# Patient Record
Sex: FEMALE | Race: BLACK OR AFRICAN AMERICAN | NOT HISPANIC OR LATINO | Employment: FULL TIME | URBAN - METROPOLITAN AREA
[De-identification: names, ages, dates, MRNs, and addresses within clinical notes are randomized per-mention and may not be internally consistent; named-entity substitution may affect disease eponyms.]

---

## 2023-05-22 ENCOUNTER — LAB (OUTPATIENT)
Dept: LAB | Facility: LAB | Age: 53
End: 2023-05-22
Payer: COMMERCIAL

## 2023-05-22 ENCOUNTER — OFFICE VISIT (OUTPATIENT)
Dept: PRIMARY CARE | Facility: CLINIC | Age: 53
End: 2023-05-22
Payer: COMMERCIAL

## 2023-05-22 VITALS
HEART RATE: 94 BPM | HEIGHT: 63 IN | WEIGHT: 226.6 LBS | TEMPERATURE: 97.1 F | DIASTOLIC BLOOD PRESSURE: 76 MMHG | BODY MASS INDEX: 40.15 KG/M2 | SYSTOLIC BLOOD PRESSURE: 135 MMHG | OXYGEN SATURATION: 98 %

## 2023-05-22 DIAGNOSIS — I10 ESSENTIAL (PRIMARY) HYPERTENSION: ICD-10-CM

## 2023-05-22 DIAGNOSIS — R60.0 BILATERAL LOWER EXTREMITY EDEMA: ICD-10-CM

## 2023-05-22 DIAGNOSIS — F32.A DEPRESSION, UNSPECIFIED DEPRESSION TYPE: ICD-10-CM

## 2023-05-22 DIAGNOSIS — Z00.00 ANNUAL PHYSICAL EXAM: Primary | ICD-10-CM

## 2023-05-22 DIAGNOSIS — Z00.00 ANNUAL PHYSICAL EXAM: ICD-10-CM

## 2023-05-22 DIAGNOSIS — F41.9 ANXIETY: ICD-10-CM

## 2023-05-22 LAB
ALBUMIN (G/DL) IN SER/PLAS: 4.7 G/DL (ref 3.4–5)
ANION GAP IN SER/PLAS: 15 MMOL/L (ref 10–20)
CALCIUM (MG/DL) IN SER/PLAS: 10.5 MG/DL (ref 8.6–10.6)
CARBON DIOXIDE, TOTAL (MMOL/L) IN SER/PLAS: 31 MMOL/L (ref 21–32)
CHLORIDE (MMOL/L) IN SER/PLAS: 102 MMOL/L (ref 98–107)
CHOLESTEROL (MG/DL) IN SER/PLAS: 200 MG/DL (ref 0–199)
CHOLESTEROL IN HDL (MG/DL) IN SER/PLAS: 53.6 MG/DL
CHOLESTEROL/HDL RATIO: 3.7
CREATININE (MG/DL) IN SER/PLAS: 0.85 MG/DL (ref 0.5–1.05)
ESTIMATED AVERAGE GLUCOSE FOR HBA1C: 134 MG/DL
GFR FEMALE: 83 ML/MIN/1.73M2
GLUCOSE (MG/DL) IN SER/PLAS: 107 MG/DL (ref 74–99)
HEMOGLOBIN A1C/HEMOGLOBIN TOTAL IN BLOOD: 6.3 %
LDL: 113 MG/DL (ref 0–99)
PHOSPHATE (MG/DL) IN SER/PLAS: 2.9 MG/DL (ref 2.5–4.9)
POTASSIUM (MMOL/L) IN SER/PLAS: 3.6 MMOL/L (ref 3.5–5.3)
SODIUM (MMOL/L) IN SER/PLAS: 144 MMOL/L (ref 136–145)
TRIGLYCERIDE (MG/DL) IN SER/PLAS: 168 MG/DL (ref 0–149)
UREA NITROGEN (MG/DL) IN SER/PLAS: 11 MG/DL (ref 6–23)
VLDL: 34 MG/DL (ref 0–40)

## 2023-05-22 PROCEDURE — 36415 COLL VENOUS BLD VENIPUNCTURE: CPT

## 2023-05-22 PROCEDURE — 3078F DIAST BP <80 MM HG: CPT | Performed by: STUDENT IN AN ORGANIZED HEALTH CARE EDUCATION/TRAINING PROGRAM

## 2023-05-22 PROCEDURE — 3075F SYST BP GE 130 - 139MM HG: CPT | Performed by: STUDENT IN AN ORGANIZED HEALTH CARE EDUCATION/TRAINING PROGRAM

## 2023-05-22 PROCEDURE — 80069 RENAL FUNCTION PANEL: CPT

## 2023-05-22 PROCEDURE — 1036F TOBACCO NON-USER: CPT | Performed by: STUDENT IN AN ORGANIZED HEALTH CARE EDUCATION/TRAINING PROGRAM

## 2023-05-22 PROCEDURE — 83036 HEMOGLOBIN GLYCOSYLATED A1C: CPT

## 2023-05-22 PROCEDURE — 80061 LIPID PANEL: CPT

## 2023-05-22 PROCEDURE — 99396 PREV VISIT EST AGE 40-64: CPT | Performed by: STUDENT IN AN ORGANIZED HEALTH CARE EDUCATION/TRAINING PROGRAM

## 2023-05-22 RX ORDER — CHLORTHALIDONE 25 MG/1
25 TABLET ORAL DAILY
COMMUNITY
End: 2023-05-22 | Stop reason: SDUPTHER

## 2023-05-22 RX ORDER — CHLORTHALIDONE 25 MG/1
25 TABLET ORAL DAILY
Qty: 90 TABLET | Refills: 3 | Status: SHIPPED | OUTPATIENT
Start: 2023-05-22

## 2023-05-22 RX ORDER — PAROXETINE 30 MG/1
30 TABLET, FILM COATED ORAL DAILY
Qty: 90 TABLET | Refills: 3 | Status: SHIPPED | OUTPATIENT
Start: 2023-05-22

## 2023-05-22 RX ORDER — PAROXETINE 30 MG/1
30 TABLET, FILM COATED ORAL DAILY
COMMUNITY
End: 2023-05-22 | Stop reason: SDUPTHER

## 2023-05-22 NOTE — PROGRESS NOTES
"Subjective   Patient ID: Ludmila is a 53 y.o. female with PMH of hypertension, anxiety, and depression who presents for Annual Exam (CPE).    Concerns today: Has been noting some leg swelling for about 1 week. Both legs but R>L. Just swollen, no pain. No recent changes in activity. Has gained a lot of weight recently. Has noted peeing a little more than normal. No changes in drinking. Drinks a lot of water. No chest pain or difficulty breathing. No numbness or tingling in the feet.    # Health Maintenance  - Last prior HM: 2022  - Patient's rating of their own health: Fair  - Dental Care: last prior dental visit: 2 months ago // brushes teeth: yes // flosses teeth: yes // denies current tooth pain  - Vision: last prior ophtho visit: last year // corrective devices: glasses // recent vision: no concerns, occasional blurring with tiredness  - Hearing: denies recent hearing loss: no  - Diet: \"terrible.\" Just eats and snacks, replaced smoking with snacks. Eats cereal, popcorn, cookies, chips, donuts. Eats one regular meal a day.  - Exercise: not exercising currently. Goes walking with son sometimes. Feels like body stiffening up.  - Weight: increasing, about 40 lbs in the last year  - Smoking: ex-smoker - quit last year (1 ppd for 1 year, 0.5 ppd for about 24 years)  - EtOH: Alcohol Use: No, patient does not drink alcohol.  - Illicit substances: denied.  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  - Colon CA: last prior screening: never // family h/o colon ca? No    WOMEN  - Menstrual Status: S/P complete hysterectomy  - Pregnancy history:  x2  - Sexual History: Not currently sexually active  - Bladder dysfunction? has not had any episodes of incontinence  - Cervical CA: s/p total hysterectomy  - Breast CA: last prior mammo: never had one    Review of Systems: as above in HPI    Current Outpatient Medications " "  Medication Instructions    chlorthalidone (HYGROTON) 25 mg, oral, Daily    PARoxetine (PAXIL) 30 mg, oral, Daily     Objective     Vitals: /76 (BP Location: Left arm, Patient Position: Sitting)   Pulse 94   Temp 36.2 °C (97.1 °F)   Ht 1.588 m (5' 2.5\")   Wt 103 kg (226 lb 9.6 oz)   SpO2 98%   BMI 40.79 kg/m²      Physical Exam  Vitals reviewed.   Constitutional:       General: She is not in acute distress.     Appearance: Normal appearance. She is obese.   HENT:      Head: Normocephalic and atraumatic.   Eyes:      Conjunctiva/sclera: Conjunctivae normal.   Cardiovascular:      Rate and Rhythm: Normal rate and regular rhythm.      Heart sounds: No murmur heard.     No friction rub. No gallop.   Pulmonary:      Effort: Pulmonary effort is normal. No respiratory distress.      Breath sounds: No wheezing, rhonchi or rales.   Musculoskeletal:      Right lower leg: No tenderness. 1+ Pitting Edema (R>L) present.      Left lower leg: No tenderness. 1+ Pitting Edema present.      Right ankle: Normal pulse.      Left ankle: Normal pulse.   Neurological:      Mental Status: She is alert.       Assessment/Plan     # Routine Health Maintenance  IMMUNIZATIONS  Immunization History   Administered Date(s) Administered    Tdap 03/30/2018, 10/26/2019   - Prevnar (PCV20): not currently indicated d/t lack of risk factors  - Tdap: next due October 2029  - Shingrix (50+): recommended, patient currently declines  - Lifetime HIV, HepC: results not on file but patient reports completion w/ negative results  - Lipid Panel (35M,45F): ordered  - DM screening: ordered  - HTN screening: mildly elevated above goal (135/76 w/ goal of <130/80)  - Tobacco Cessation: quit smoking 1 year ago (~13 year pack history)  - Dental: recommended follow up every 6mo  - Eye exam: recommended follow up annually  - Colonoscopy (45-75): ordered  - Lung CA screening (50-80, >20py): not indicated, pack years <20  - Pap smear (21-65): s/p total " hysterectomy, not indicated  - Breast CA screening: ordered    Problem List Items Addressed This Visit          Circulatory    Essential (primary) hypertension     -BP mildly above goal at 135 systolic w/ goal of <130  -extensive lifestyle modification discussion performed; patient appears to have good motivation to make changes  -will continue to monitor         Relevant Medications    chlorthalidone (Hygroton) 25 mg tablet    Other Relevant Orders    Renal Function Panel (Completed)       Musculoskeletal    Bilateral lower extremity edema     -no concern DVT given lack of pain, lack of recent immobility, and bilateral nature of swelling  -suspect venous hypertension given no hx of renal, cardiac, or hepatic disease  -will check RFP for further evaluation  -recommended compression, elevation of the legs  -lifestyle modifications         Relevant Orders    Renal Function Panel (Completed)       Other    Depression    Relevant Medications    PARoxetine (Paxil) 30 mg tablet    Anxiety    Relevant Medications    PARoxetine (Paxil) 30 mg tablet    Annual physical exam - Primary    Relevant Orders    Colonoscopy    BI mammo bilateral screening tomosynthesis    Lipid Panel (Completed)    Hemoglobin A1C (Completed)       Attending Supervision: Patient discussed with attending physician Dr. Jane.    RTC in 3 months to assess progress with lifestyle modifications, or earlier as needed.    Andrés Aguilar MD   PGY-2  [facility]

## 2023-05-22 NOTE — PATIENT INSTRUCTIONS
Dear Ms. Davidson,     It was a pleasure getting to manage your care with you today.     Labs:  We ordered Labs for you at the  Labs.     Directions to  Labs:   1. You can find them as you exit our clinic walk past the coffee shop.   2. Turn Right and walk to the end of the [address] (you will see a staircase).   3. When you arrive at the end of the [address], turn left and walk down the hallway with skylights.   4. Half way down the [address] light hallway you will see the lab on your left    Prescriptions:  We have sent medication prescriptions to your pharmacy on file. Please pick them up at your earliest convenience.     Referral:   We have provided you the below referrals. Please call to schedule referrals if no one has contacted you within 3 days.   1. Colonoscopy  2. Mammogram    Try to get over the counter compression socks that go up to your knee. The strength should be 20-30 mmHg.  Elevate your legs as much as possible when you're not walking around.  Try to elevate the foot of your bed with bricks, or elevate your legs with a pillow.    Follow up Appointment: 3 months    Emergency  In the case of an emergency please call 911 or visit the Emergency Department immediately for evaluation.     We look forward to continuing your care here at our Clinic. Take Care.     Sincerely,   Andrés Aguilar MD

## 2023-05-22 NOTE — PROGRESS NOTES
53 years old female here today for  visit  Colon cancer, mammogram screening ordered   No pap smear (had a hysterectomy previously)   Immunizations, shingrix declined today   Labs ordered today HBA1C, lipid panel, RFP (given worsening lower extremity edema)   Medication refills provided today as well.    Allen Parham MD  Family Medicine, PGY-3  [facility]

## 2023-05-24 PROBLEM — J45.909 REACTIVE AIRWAY DISEASE (HHS-HCC): Status: ACTIVE | Noted: 2023-05-24

## 2023-05-24 PROBLEM — E55.9 VITAMIN D DEFICIENCY: Status: ACTIVE | Noted: 2023-05-24

## 2023-05-24 PROBLEM — F17.200 NICOTINE DEPENDENCE: Status: ACTIVE | Noted: 2023-05-24

## 2023-05-24 PROBLEM — K21.9 LARYNGOPHARYNGEAL REFLUX (LPR): Status: ACTIVE | Noted: 2023-05-24

## 2023-05-24 PROBLEM — F17.200 NICOTINE DEPENDENCE: Status: RESOLVED | Noted: 2023-05-24 | Resolved: 2023-05-24

## 2023-05-24 PROBLEM — R73.03 PREDIABETES: Status: ACTIVE | Noted: 2023-05-24

## 2023-05-24 PROBLEM — R00.2 PALPITATIONS: Status: ACTIVE | Noted: 2023-05-24

## 2023-05-24 PROBLEM — J30.2 SEASONAL ALLERGIES: Status: ACTIVE | Noted: 2023-05-24

## 2023-05-24 PROBLEM — E87.6 HYPOKALEMIA: Status: RESOLVED | Noted: 2023-05-24 | Resolved: 2023-05-24

## 2023-05-24 PROBLEM — E87.6 HYPOKALEMIA: Status: ACTIVE | Noted: 2023-05-24

## 2023-05-24 NOTE — ASSESSMENT & PLAN NOTE
-no concern DVT given lack of pain, lack of recent immobility, and bilateral nature of swelling  -suspect venous hypertension given no hx of renal, cardiac, or hepatic disease  -will check RFP for further evaluation  -recommended compression, elevation of the legs  -lifestyle modifications

## 2023-05-24 NOTE — ASSESSMENT & PLAN NOTE
-BP mildly above goal at 135 systolic w/ goal of <130  -extensive lifestyle modification discussion performed; patient appears to have good motivation to make changes  -will continue to monitor

## 2023-05-25 NOTE — PROGRESS NOTES
I reviewed with the resident the medical history and the resident’s findings on physical examination.  I discussed with the resident the patient’s diagnosis and concur with the treatment plan as documented in the resident note.     Farzad Jane MD

## 2024-01-30 ENCOUNTER — OFFICE VISIT (OUTPATIENT)
Dept: PRIMARY CARE | Facility: CLINIC | Age: 54
End: 2024-01-30
Payer: COMMERCIAL

## 2024-01-30 VITALS
BODY MASS INDEX: 41.31 KG/M2 | TEMPERATURE: 97.4 F | HEART RATE: 71 BPM | HEIGHT: 62 IN | RESPIRATION RATE: 18 BRPM | SYSTOLIC BLOOD PRESSURE: 132 MMHG | DIASTOLIC BLOOD PRESSURE: 84 MMHG | OXYGEN SATURATION: 99 % | WEIGHT: 224.5 LBS

## 2024-01-30 DIAGNOSIS — I10 PRIMARY HYPERTENSION: Primary | ICD-10-CM

## 2024-01-30 DIAGNOSIS — R20.2 PARESTHESIA OF BOTH HANDS: ICD-10-CM

## 2024-01-30 DIAGNOSIS — F41.9 ANXIETY: ICD-10-CM

## 2024-01-30 PROCEDURE — 1036F TOBACCO NON-USER: CPT

## 2024-01-30 PROCEDURE — 3075F SYST BP GE 130 - 139MM HG: CPT

## 2024-01-30 PROCEDURE — 3079F DIAST BP 80-89 MM HG: CPT

## 2024-01-30 PROCEDURE — 99213 OFFICE O/P EST LOW 20 MIN: CPT

## 2024-01-30 ASSESSMENT — ENCOUNTER SYMPTOMS
NAUSEA: 0
NUMBNESS: 1
BACK PAIN: 0
CHEST TIGHTNESS: 0
EYE PAIN: 0
WHEEZING: 1
VOMITING: 0
COUGH: 0
FEVER: 0
UNEXPECTED WEIGHT CHANGE: 0
NECK PAIN: 0
SHORTNESS OF BREATH: 0
PALPITATIONS: 0
ARTHRALGIAS: 1
ABDOMINAL PAIN: 0
LIGHT-HEADEDNESS: 0
DYSPHORIC MOOD: 0
WEAKNESS: 0
APPETITE CHANGE: 0
SLEEP DISTURBANCE: 0
CONSTIPATION: 0
COLOR CHANGE: 0
CHILLS: 0
FATIGUE: 0
DIARRHEA: 0
DECREASED CONCENTRATION: 0
TROUBLE SWALLOWING: 0
HEADACHES: 0

## 2024-01-30 ASSESSMENT — PAIN SCALES - GENERAL: PAINLEVEL: 0-NO PAIN

## 2024-01-30 NOTE — PATIENT INSTRUCTIONS
It was so great to see you today Ludmila Davidson  . Today we discussed:    -record blood pressures at home  -schedule colonoscopy at convenience. Call number below   -Ordered massage script. Contact [facility] to schedule  -Follow up 6 months    Call [phone]  For Care if you need to schedule appointment with specialist or images.    Follow up in       You were see by:    Dr. Renea Moore D.O.  [facility]   Phone: [phone]

## 2024-01-30 NOTE — PROGRESS NOTES
"Subjective   Patient ID: Ludmila Davidson is a 54 y.o. female who presents for HTN follow up and additional concerns.      HPI   Concerns  BL finger numbness, ring & middle fingers. Started two months ago, worse in morning. Thinks it's due to sleep. Denies weakness, tingling    Hypertension  BP Readings from Last 3 Encounters:   01/30/24 132/84   05/22/23 135/76   04/20/22 (!) 142/91     - meds:   - missing doses?:  140 histolics  - taking BP at home?: ***  - smoking: ***  - diet: ***  - Consistent NSAIDs?: ***  - denies HA, chest pains, SOB, LE edema, vision changes        Review of Systems    Objective   /84 (BP Location: Left arm, Patient Position: Sitting, BP Cuff Size: Adult)   Pulse 71   Temp 36.3 °C (97.4 °F) (Temporal)   Resp 18   Ht 1.575 m (5' 2\")   Wt 102 kg (224 lb 8 oz)   SpO2 99%   BMI 41.06 kg/m²     Physical Exam    Assessment/Plan   Diagnoses and all orders for this visit:  Primary hypertension  -    Anxiety    Paresthesia of both hands  -     Referral to Massage Therapy; Future  Other orders  -     Follow Up In Primary Care     Etiologies: cervical radiculopathy, compression, inflammation, metabolic  "

## 2024-01-30 NOTE — PROGRESS NOTES
Subjective   Patient ID: Ludmila Davidson is a 54 y.o. female who presents for HTN follow up and additional concerns.    HPI   Concerns    #BL finger numbness, ring & middle fingers.  - Started two months ago, worse in morning.   - Thinks it's due to sleep positioning and bilateral shoulder pain.   - R > L  - Denies weakness, tingling, pain, or loss of sensation.   - hx of carpal tunnel during pregnancy and pt denies that this is similar    # Nodule in her left middle finger between the PIP and DIP joints  - started about 6 months ago  - slight increase in size since first appearance  - denies pain     Hypertension  BP Readings from Last 3 Encounters:   01/30/24 132/84   05/22/23 135/76   04/20/22 (!) 142/91     - meds: adherent, takes chlorthalidone 25 mg   - missing doses?:  denies missing doses, 140 histolics  - taking BP at home?: sometimes, ranges 130-140/60-80  - smoking: quit 2 years ago  - diet: trying to incorporate more fruits and veggies  - Consistent NSAIDs?: prior consistent NSAID use due to HA; denies recent HA and NSAID use within the past 2 months  - denies HA, chest pains, SOB, LE edema, vision changes    Review of Systems   Constitutional:  Negative for appetite change, chills, fatigue, fever and unexpected weight change.   HENT:  Negative for ear pain, hearing loss and trouble swallowing.    Eyes:  Negative for pain and visual disturbance.   Respiratory:  Positive for wheezing. Negative for cough, chest tightness and shortness of breath.    Cardiovascular:  Negative for chest pain, palpitations and leg swelling.   Gastrointestinal:  Negative for abdominal pain, constipation, diarrhea, nausea and vomiting.   Musculoskeletal:  Positive for arthralgias. Negative for back pain and neck pain.   Skin:  Negative for color change and rash.   Neurological:  Positive for numbness. Negative for weakness, light-headedness and headaches.   Psychiatric/Behavioral:  Negative for decreased concentration, dysphoric  "mood and sleep disturbance.        Objective   /84 (BP Location: Left arm, Patient Position: Sitting, BP Cuff Size: Adult)   Pulse 71   Temp 36.3 °C (97.4 °F) (Temporal)   Resp 18   Ht 1.575 m (5' 2\")   Wt 102 kg (224 lb 8 oz)   SpO2 99%   BMI 41.06 kg/m²     Physical Exam  Constitutional:       Appearance: Normal appearance. She is obese.   HENT:      Head: Normocephalic and atraumatic.   Eyes:      Conjunctiva/sclera: Conjunctivae normal.      Pupils: Pupils are equal, round, and reactive to light.   Cardiovascular:      Rate and Rhythm: Normal rate and regular rhythm.      Pulses: Normal pulses.      Heart sounds: Normal heart sounds.   Pulmonary:      Effort: Pulmonary effort is normal.      Breath sounds: Normal breath sounds.   Musculoskeletal:         General: Normal range of motion.      Cervical back: Normal range of motion.      Right lower le+ Edema present.      Left lower le+ Edema present.   Skin:     General: Skin is warm and dry.      Capillary Refill: Capillary refill takes less than 2 seconds.      Comments:   Nodule on the L middle finger between PIP and DIP; nontender to palpation, soft, nonflunctuant   Neurological:      General: No focal deficit present.      Mental Status: She is alert and oriented to person, place, and time.      Sensory: Sensation is intact.      Motor: No weakness.      Comments:   No loss of sensation in bilateral UE and fingers  Phalen test negative; Spurling test negative  5/5 strength in all extremeties; no weakness noted on exam   Psychiatric:         Mood and Affect: Mood normal. Mood is not anxious or depressed.         Speech: Speech normal.         Behavior: Behavior normal.         Thought Content: Thought content normal.         Judgment: Judgment normal.       Assessment/Plan     Ludmila Davidson is a 54 year old woman with past medical history of HTN, prediabetes (last A1c 6.3), depression, and anxiety presenting for blood pressure check. " Patient has been without antihypertensives for over a month.  She is unsure what she was on previously with prior PCP. She also has acute concerns for BL finger numbness, ring & middle fingers. Negative Phalen's & Spurling's special test. Likely muscular tightness shoulder/neck with pressure on bundle nerves.  Numbness in middle and ring fingers (R>L), ddx considered includes carpal tunnel, cervical radiculopathy, inflammation, and metabolic.  Additionally, nodule in her left middle finger between the PIP and DIP joints likely benign epidermoid cyst. Pt is HDS and in no acute distress. Detailed plan below:    Diagnoses and all orders for this visit:  Primary hypertension, at goal of <140/90  -     IO /84  -     Continue chlorthalidone    -     Referral to [facility]; Future: Pt encouraged to improve on healthy diet and exercise habits and referred to [facility].   -    Pt encouraged to monitor BP at home and given BP log to bring into next appt  Anxiety  -   Continue paroxetine   Paresthesia of both hands, (3rd & 4th digits tips)  -     Referral to Massage Therapy; Future  - Manage with conservative measures: Epson salt baths, heating pads, stretching neck, shoulders, & back   Other orders  -     Follow Up In Primary Care 6 months        Elzbieta Bonds, MS3        Renea Moore,  FM PGY2  I saw and evaluated the patient with the medical student. I personally obtained the key and critical portions of the history and physical exam. I reviewed and modified the student's documentation and discussed patient with the medical student. I agree with the above documentation and medical decision making.    Discussed with Dr. Oor

## 2024-01-31 NOTE — PROGRESS NOTES
I reviewed the resident documentation and discussed the patient with the resident. I agree with the resident's medical decision making as documented in the note.    Jose A Oro MD

## 2024-08-26 ENCOUNTER — TELEMEDICINE (OUTPATIENT)
Dept: PRIMARY CARE | Facility: CLINIC | Age: 54
End: 2024-08-26
Payer: COMMERCIAL

## 2024-08-26 DIAGNOSIS — I10 ESSENTIAL (PRIMARY) HYPERTENSION: ICD-10-CM

## 2024-08-26 DIAGNOSIS — F32.A DEPRESSION, UNSPECIFIED DEPRESSION TYPE: ICD-10-CM

## 2024-08-26 DIAGNOSIS — F41.9 ANXIETY: ICD-10-CM

## 2024-08-26 PROCEDURE — 1036F TOBACCO NON-USER: CPT | Performed by: NURSE PRACTITIONER

## 2024-08-26 PROCEDURE — 99213 OFFICE O/P EST LOW 20 MIN: CPT | Performed by: NURSE PRACTITIONER

## 2024-08-26 RX ORDER — PAROXETINE 30 MG/1
30 TABLET, FILM COATED ORAL DAILY
Qty: 90 TABLET | Refills: 0 | Status: SHIPPED | OUTPATIENT
Start: 2024-08-26

## 2024-08-26 RX ORDER — CHLORTHALIDONE 25 MG/1
25 TABLET ORAL DAILY
Qty: 90 TABLET | Refills: 0 | Status: SHIPPED | OUTPATIENT
Start: 2024-08-26

## 2024-08-26 NOTE — ASSESSMENT & PLAN NOTE
Has been stable with the paroxetine  She has been out of paroxetine for 2 days  She denies SI  Refilled paroxetine and discussed to send request via [employer] to office to get follow-up appointment scheduled

## 2024-08-26 NOTE — PROGRESS NOTES
"Subjective   Patient ID: Ludmila Davidson is a 54 y.o. female who presents for No chief complaint on file..    This visit was completed via [employer] due to the restrictions of the COVID-19 pandemic. All issues as below were discussed and addressed but no physical exam was performed. If it was felt that the patient should be evaluated in clinic than they were directed there. The patient verbally consented to the visit.  Patient is located in [place] and verified   Patient presents virtually for a medication refill she did send a refill request to her PCP office last week and had not heard back.  She called to schedule an appointment last week as well and was told that they would get back to her.  She is due for her yearly physical and for a follow-up.  She has been out of her chlorthalidone and paroxetine for 2 days.  She denies suicidal ideations or chest pain.  She states that she is starting to feel a little \"off\" from not having the paroxetine.  She does take her blood pressure at home normally on a normal basis and it has been stable         Review of Systems   All other systems reviewed and are negative.      Objective   There were no vitals taken for this visit.    Physical Exam    Assessment/Plan   Problem List Items Addressed This Visit             ICD-10-CM    Essential (primary) hypertension I10     Discussed with patient that she is due for a follow-up and I can refill 1 refill for her to get into her PCP office  I also discussed with her to send a message via [employer] for an appointment request so that she can get in sooner.  Red flags discussed such as chest pain shortness of breath or headache and she will continue to monitor her blood pressure         Relevant Medications    chlorthalidone (Hygroton) 25 mg tablet    Depression F32.A     Has been stable with the paroxetine  She has been out of paroxetine for 2 days  She denies SI  Refilled paroxetine and discussed to send request via [employer] to office to get " follow-up appointment scheduled         Relevant Medications    PARoxetine (Paxil) 30 mg tablet    Anxiety F41.9    Relevant Medications    PARoxetine (Paxil) 30 mg tablet

## 2024-08-26 NOTE — ASSESSMENT & PLAN NOTE
Discussed with patient that she is due for a follow-up and I can refill 1 refill for her to get into her PCP office  I also discussed with her to send a message via [employer] for an appointment request so that she can get in sooner.  Red flags discussed such as chest pain shortness of breath or headache and she will continue to monitor her blood pressure

## 2024-11-26 ENCOUNTER — LAB (OUTPATIENT)
Dept: LAB | Facility: LAB | Age: 54
End: 2024-11-26
Payer: COMMERCIAL

## 2024-11-26 ENCOUNTER — APPOINTMENT (OUTPATIENT)
Dept: PRIMARY CARE | Facility: CLINIC | Age: 54
End: 2024-11-26
Payer: COMMERCIAL

## 2024-11-26 ENCOUNTER — TELEPHONE (OUTPATIENT)
Dept: PRIMARY CARE | Facility: CLINIC | Age: 54
End: 2024-11-26

## 2024-11-26 VITALS
WEIGHT: 227 LBS | HEART RATE: 85 BPM | SYSTOLIC BLOOD PRESSURE: 129 MMHG | HEIGHT: 63 IN | TEMPERATURE: 97 F | DIASTOLIC BLOOD PRESSURE: 84 MMHG | BODY MASS INDEX: 40.22 KG/M2 | OXYGEN SATURATION: 94 %

## 2024-11-26 DIAGNOSIS — R79.89 ELEVATED LFTS: ICD-10-CM

## 2024-11-26 DIAGNOSIS — E11.9 TYPE 2 DIABETES MELLITUS WITHOUT COMPLICATION, WITHOUT LONG-TERM CURRENT USE OF INSULIN (MULTI): Primary | ICD-10-CM

## 2024-11-26 DIAGNOSIS — Z12.2 SCREENING FOR LUNG CANCER: ICD-10-CM

## 2024-11-26 DIAGNOSIS — Z00.00 HEALTHCARE MAINTENANCE: ICD-10-CM

## 2024-11-26 DIAGNOSIS — E66.813 CLASS 3 SEVERE OBESITY DUE TO EXCESS CALORIES WITHOUT SERIOUS COMORBIDITY WITH BODY MASS INDEX (BMI) OF 40.0 TO 44.9 IN ADULT: ICD-10-CM

## 2024-11-26 DIAGNOSIS — E66.01 CLASS 3 SEVERE OBESITY DUE TO EXCESS CALORIES WITHOUT SERIOUS COMORBIDITY WITH BODY MASS INDEX (BMI) OF 40.0 TO 44.9 IN ADULT: ICD-10-CM

## 2024-11-26 DIAGNOSIS — Z59.41 FOOD INSECURITY: ICD-10-CM

## 2024-11-26 DIAGNOSIS — Z12.11 COLON CANCER SCREENING: ICD-10-CM

## 2024-11-26 DIAGNOSIS — Z12.31 ENCOUNTER FOR SCREENING MAMMOGRAM FOR MALIGNANT NEOPLASM OF BREAST: ICD-10-CM

## 2024-11-26 DIAGNOSIS — Z00.00 HEALTHCARE MAINTENANCE: Primary | ICD-10-CM

## 2024-11-26 LAB
ALBUMIN SERPL BCP-MCNC: 4.6 G/DL (ref 3.4–5)
ALP SERPL-CCNC: 84 U/L (ref 33–110)
ALT SERPL W P-5'-P-CCNC: 47 U/L (ref 7–45)
ANION GAP SERPL CALC-SCNC: 14 MMOL/L (ref 10–20)
AST SERPL W P-5'-P-CCNC: 34 U/L (ref 9–39)
BILIRUB SERPL-MCNC: 0.7 MG/DL (ref 0–1.2)
BUN SERPL-MCNC: 13 MG/DL (ref 6–23)
CALCIUM SERPL-MCNC: 9.7 MG/DL (ref 8.6–10.6)
CHLORIDE SERPL-SCNC: 98 MMOL/L (ref 98–107)
CHOLEST SERPL-MCNC: 189 MG/DL (ref 0–199)
CHOLESTEROL/HDL RATIO: 3.9
CO2 SERPL-SCNC: 32 MMOL/L (ref 21–32)
CREAT SERPL-MCNC: 0.63 MG/DL (ref 0.5–1.05)
EGFRCR SERPLBLD CKD-EPI 2021: >90 ML/MIN/1.73M*2
EST. AVERAGE GLUCOSE BLD GHB EST-MCNC: 151 MG/DL
GLUCOSE SERPL-MCNC: 149 MG/DL (ref 74–99)
HBA1C MFR BLD: 6.9 %
HCV AB SER QL: NONREACTIVE
HDLC SERPL-MCNC: 48.1 MG/DL
LDLC SERPL CALC-MCNC: 125 MG/DL
NON HDL CHOLESTEROL: 141 MG/DL (ref 0–149)
POTASSIUM SERPL-SCNC: 3.3 MMOL/L (ref 3.5–5.3)
PROT SERPL-MCNC: 7.2 G/DL (ref 6.4–8.2)
SODIUM SERPL-SCNC: 141 MMOL/L (ref 136–145)
TRIGL SERPL-MCNC: 78 MG/DL (ref 0–149)
VLDL: 16 MG/DL (ref 0–40)

## 2024-11-26 PROCEDURE — 83036 HEMOGLOBIN GLYCOSYLATED A1C: CPT

## 2024-11-26 PROCEDURE — 3074F SYST BP LT 130 MM HG: CPT

## 2024-11-26 PROCEDURE — 3008F BODY MASS INDEX DOCD: CPT

## 2024-11-26 PROCEDURE — 80061 LIPID PANEL: CPT

## 2024-11-26 PROCEDURE — 3079F DIAST BP 80-89 MM HG: CPT

## 2024-11-26 PROCEDURE — 86803 HEPATITIS C AB TEST: CPT

## 2024-11-26 PROCEDURE — 1036F TOBACCO NON-USER: CPT

## 2024-11-26 PROCEDURE — 80053 COMPREHEN METABOLIC PANEL: CPT

## 2024-11-26 PROCEDURE — 99396 PREV VISIT EST AGE 40-64: CPT

## 2024-11-26 PROCEDURE — 36415 COLL VENOUS BLD VENIPUNCTURE: CPT

## 2024-11-26 RX ORDER — VIT C/E/ZN/COPPR/LUTEIN/ZEAXAN 250MG-90MG
CAPSULE ORAL
COMMUNITY
Start: 2019-06-05

## 2024-11-26 RX ORDER — ATORVASTATIN CALCIUM 40 MG/1
40 TABLET, FILM COATED ORAL DAILY
Qty: 100 TABLET | Refills: 3 | Status: SHIPPED | OUTPATIENT
Start: 2024-11-26 | End: 2025-12-31

## 2024-11-26 RX ORDER — METFORMIN HYDROCHLORIDE 500 MG/1
500 TABLET ORAL
Qty: 200 TABLET | Refills: 3 | Status: SHIPPED | OUTPATIENT
Start: 2024-11-26 | End: 2025-12-31

## 2024-11-26 SDOH — ECONOMIC STABILITY - FOOD INSECURITY: FOOD INSECURITY: Z59.41

## 2024-11-26 ASSESSMENT — ENCOUNTER SYMPTOMS
LOSS OF SENSATION IN FEET: 0
DEPRESSION: 1
OCCASIONAL FEELINGS OF UNSTEADINESS: 0

## 2024-11-26 ASSESSMENT — PATIENT HEALTH QUESTIONNAIRE - PHQ9
2. FEELING DOWN, DEPRESSED OR HOPELESS: NOT AT ALL
1. LITTLE INTEREST OR PLEASURE IN DOING THINGS: NOT AT ALL
SUM OF ALL RESPONSES TO PHQ9 QUESTIONS 1 AND 2: 0

## 2024-11-26 ASSESSMENT — COLUMBIA-SUICIDE SEVERITY RATING SCALE - C-SSRS
2. HAVE YOU ACTUALLY HAD ANY THOUGHTS OF KILLING YOURSELF?: NO
1. IN THE PAST MONTH, HAVE YOU WISHED YOU WERE DEAD OR WISHED YOU COULD GO TO SLEEP AND NOT WAKE UP?: NO
6. HAVE YOU EVER DONE ANYTHING, STARTED TO DO ANYTHING, OR PREPARED TO DO ANYTHING TO END YOUR LIFE?: NO

## 2024-11-26 ASSESSMENT — PAIN SCALES - GENERAL: PAINLEVEL_OUTOF10: 0-NO PAIN

## 2024-11-26 NOTE — PROGRESS NOTES
Subjective   Patient ID: Ludmila Davidson is a 54 y.o. female with PMH of HTN, prediabetes, depression, and anxiety  who presents for Annual Exam.    # Health Maintenance  - Last prior HM: Last year  - Patient's rating of their own health: Good  - Dental Care: last prior dental visit last year // brushes teeth every day // flosses teeth yes // denies current tooth pain  - Vision: corrective devices: glasses // recent vision: last year  - Hearing: denies recent hearing loss   - Diet: Appetite has been good. Diet is poor and report she has gained a lot of weight. She eats fast food, fried foods, and drinks sodas. Reports she wants to work on it and lose weight  - Exercise: not exercising but planning to start   - Weight: stable,   - Smoking: ex-smoker Stopped smoking 2 years ago, smoked around 1 pack per day since she was 24   - EtOH: Alcohol Use: No, patient does not drink alcohol.  - Illicit substances: denied.  - Employment: Works with ex-offenders to reintegrate them into society  - Living Situation: Lives with 2 daughters and feels safe at home  - Colon CA: Never been screened. family h/o colon ca? No    WOMEN  - Menstrual Status:  Had a hysterectomy in 2014  - No LMP recorded.  - Pregnancy history: No obstetric history on file.  - Cervical CA: last prior pap 4/2014 // h/o abnormal pap? No  - Breast CA: Never had a mammogram  - Not currently sexually active, last active 2 years ago  - Does not want to be screened for STI    HPI  #Obesity  - Reported that she quit smoking 3 years ago and has been eating unhealthy since and has gained a lot of weight  - Reports she has not been able to lose any weight but also reports her diet is poor and does not exercise  - Interested in losing weight and plans to start exercising    Current Outpatient Medications   Medication Instructions    chlorthalidone (HYGROTON) 25 mg, oral, Daily    cholecalciferol (Vitamin D-3) 25 MCG (1000 UT) capsule Take by mouth.    PARoxetine (PAXIL) 30  "mg, oral, Daily       Objective     Vitals: /84 (BP Location: Left arm, Patient Position: Sitting)   Pulse 85   Temp 36.1 °C (97 °F) (Temporal)   Ht 1.588 m (5' 2.5\")   Wt 103 kg (227 lb)   SpO2 94%   BMI 40.86 kg/m²    Physical Exam  Constitutional:       General: She is not in acute distress.     Appearance: Normal appearance. She is obese. She is not ill-appearing.   HENT:      Head: Normocephalic and atraumatic.   Eyes:      Extraocular Movements: Extraocular movements intact.   Cardiovascular:      Rate and Rhythm: Normal rate and regular rhythm.      Heart sounds: Normal heart sounds. No murmur heard.     No friction rub. No gallop.   Pulmonary:      Effort: Pulmonary effort is normal. No respiratory distress.      Breath sounds: Normal breath sounds. No stridor. No wheezing, rhonchi or rales.   Abdominal:      General: There is no distension.      Palpations: Abdomen is soft. There is no mass.      Tenderness: There is no abdominal tenderness. There is no guarding.      Hernia: No hernia is present.   Musculoskeletal:         General: Normal range of motion.      Cervical back: Normal range of motion.      Right lower leg: No edema.      Left lower leg: No edema.   Skin:     General: Skin is warm and dry.   Neurological:      Mental Status: She is alert.   Psychiatric:         Mood and Affect: Mood normal.         Behavior: Behavior normal.         PHQ2: Score of 0, negative  Over the past 2 weeks, how often have you been bothered by any of the following problems?  Little interest or pleasure in doing things: Not at all  Feeling down, depressed, or hopeless: Not at all  Patient Health Questionnaire-2 Score: 0    Food insecurity: Yes, would like a referral to [facility]  Food Insecurity: Not on file       Assessment/Plan   Ludmila Davidson is a 54 y.o. female with PMH of HTN, prediabetes, depression, and anxiety  who presents for Annual Exam.    # Routine Health Maintenance  Immunization History "   Administered Date(s) Administered    Tdap vaccine, age 7 year and older (BOOSTRIX, ADACEL) 03/30/2018, 10/26/2019     - Flu vaccine: recommended annually, Declined flu shot  - COVID vaccine: recommended completion of primary series and recommended boosters,   - Prevnar (PCV20): Declined but advised that she may return to clinic if she changes her mind  - Tdap: next due 2029  - Shingrix (50+): Declined but advised that she can get it at a pharmacy if she changes her mind  - Declined STI screen: GC, chlamydia, trich, syphilis, HIV  - Lifetime HepC: Ordered  - Lipid Panel (35M,45F): Ordered  - DM screening: Last Hgb A1c 6.3% from 5/2023, ordered updated Hgb A1c  - Ordered CMP  - HTN screening: wnl today  - Food Insecurity screen: Referral for [facility] placed  - Depression: PHQ-2 Score of 0, negative  - Tobacco Cessation: Ex-smoker  - Last Dental: recommended follow up every 6mo   - Last Eye exam: recommended follow up annually   - Colonoscopy (45-75): Referral placed  - Lung CA screening (50-80, >20py): discussed importance of continued surveillance rather than single exam, discussed r/b/a, with shared decision making patient and referral placed  - Pap smear (21-65F): Hysterectomy in 2014  - Breast CA screening (40-74F): Referral placed    #Obesity  - Discussed weight loss program [facility] and patient was interested in enrolling  - Referral placed for [facility]    Problem List Items Addressed This Visit    None  Visit Diagnoses       Healthcare maintenance    -  Primary    Relevant Orders    Lipid panel    Hemoglobin A1c    Comprehensive metabolic panel    Hepatitis C antibody    Class 3 severe obesity due to excess calories without serious comorbidity with body mass index (BMI) of 40.0 to 44.9 in adult        Relevant Orders    Referral to [facility]    Food insecurity        Relevant Orders    Referral to [facility]    Screening for lung cancer        Relevant Orders    CT lung screening low dose    Colon  cancer screening        Relevant Orders    Colonoscopy Screening; Average Risk Patient    Encounter for screening mammogram for malignant neoplasm of breast        Relevant Orders    BI mammo bilateral screening tomosynthesis            Attending Supervision: discussed with attending physician (cosigner listed on this note - Dr. Jane).    RTC in 6 months for follow up, or earlier as needed.    Reviewed and approved by JUAN HAGER on 11/26/24 at 11:19 AM.

## 2024-11-26 NOTE — TELEPHONE ENCOUNTER
Result Communication    Resulted Orders   Lipid panel   Result Value Ref Range    Cholesterol 189 0 - 199 mg/dL      Comment:            Age      Desirable   Borderline High   High     0-19 Y     0 - 169       170 - 199     >/= 200    20-24 Y     0 - 189       190 - 224     >/= 225         >24 Y     0 - 199       200 - 239     >/= 240   **All ranges are based on fasting samples. Specific   therapeutic targets will vary based on patient-specific   cardiac risk.    Pediatric guidelines reference:Pediatrics 2011, 128(S5).Adult guidelines reference: NCEP ATPIII Guidelines,JACK 2001, 258:2486-97    Venipuncture immediately after or during the administration of Metamizole may lead to falsely low results. Testing should be performed immediately prior to Metamizole dosing.    HDL-Cholesterol 48.1 mg/dL      Comment:        Age       Very Low   Low     Normal    High    0-19 Y    < 35      < 40     40-45     ----  20-24 Y    ----     < 40      >45      ----        >24 Y      ----     < 40     40-60      >60      Cholesterol/HDL Ratio 3.9       Comment:        Ref Values  Desirable  < 3.4  High Risk  > 5.0    LDL Calculated 125 (H) <=99 mg/dL      Comment:                                  Near   Borderline      AGE      Desirable  Optimal    High     High     Very High     0-19 Y     0 - 109     ---    110-129   >/= 130     ----    20-24 Y     0 - 119     ---    120-159   >/= 160     ----      >24 Y     0 -  99   100-129  130-159   160-189     >/=190      VLDL 16 0 - 40 mg/dL    Triglycerides 78 0 - 149 mg/dL      Comment:      Age              Desirable        Borderline         High        Very High  SEX:B           mg/dL             mg/dL               mg/dL      mg/dL  <=14D                       ----               ----        ----  15D-365D                    ----               ----        ----  1Y-9Y           0-74               75-99             >=100       ----  10Y-19Y        0-89                             >=130       ----  20Y-24Y        0-114             115-149             >=150      ----  >= 25Y         0-149             150-199             200-499    >=500      Venipuncture immediately after or during the administration of Metamizole may lead to falsely low results. Testing should be performed immediately prior to Metamizole dosing.    Non HDL Cholesterol 141 0 - 149 mg/dL      Comment:            Age       Desirable   Borderline High   High     Very High     0-19 Y     0 - 119       120 - 144     >/= 145    >/= 160    20-24 Y     0 - 149       150 - 189     >/= 190      ----         >24 Y    30 mg/dL above LDL Cholesterol goal     Hemoglobin A1c   Result Value Ref Range    Hemoglobin A1C 6.9 (H) See comment %    Estimated Average Glucose 151 Not Established mg/dL    Narrative    Diagnosis of Diabetes-Adults  Non-Diabetic: < or = 5.6%  Increased risk for developing diabetes: 5.7-6.4%  Diagnostic of diabetes: > or = 6.5%       Comprehensive metabolic panel   Result Value Ref Range    Glucose 149 (H) 74 - 99 mg/dL    Sodium 141 136 - 145 mmol/L    Potassium 3.3 (L) 3.5 - 5.3 mmol/L    Chloride 98 98 - 107 mmol/L    Bicarbonate 32 21 - 32 mmol/L    Anion Gap 14 10 - 20 mmol/L    Urea Nitrogen 13 6 - 23 mg/dL    Creatinine 0.63 0.50 - 1.05 mg/dL    eGFR >90 >60 mL/min/1.73m*2      Comment:      Calculations of estimated GFR are performed using the 2021 CKD-EPI Study Refit equation without the race variable for the IDMS-Traceable creatinine methods.  https://jasn.asnjournals.org/content/early/2021/09/22/ASN.6356943244    Calcium 9.7 8.6 - 10.6 mg/dL    Albumin 4.6 3.4 - 5.0 g/dL    Alkaline Phosphatase 84 33 - 110 U/L    Total Protein 7.2 6.4 - 8.2 g/dL    AST 34 9 - 39 U/L    Bilirubin, Total 0.7 0.0 - 1.2 mg/dL    ALT 47 (H) 7 - 45 U/L      Comment:      Patients treated with Sulfasalazine may generate falsely decreased results for ALT.   Hepatitis C antibody   Result Value Ref Range    Hepatitis C AB  Nonreactive Nonreactive      Comment:      Results from patients taking biotin supplements or receiving high-dose biotin therapy should be interpreted with caution due to possible interference with this test. Providers may contact their local laboratory for further information.       3:12 PM      Results were successfully communicated with the patient and they acknowledged their understanding. Patient is now diabetic and LDL is elevated. Discussed starting Metformin and atorvastatin which the patient is agreeable with. Potassium is mildly low and patient was encouraged to eat potassium rich foods. ALT was mildly elevated in absence of alcohol. Will plan to recheck in 2-3 months. Hepatitis C is nonreactive

## 2024-12-10 DIAGNOSIS — F41.9 ANXIETY: ICD-10-CM

## 2024-12-10 DIAGNOSIS — F32.A DEPRESSION, UNSPECIFIED DEPRESSION TYPE: ICD-10-CM

## 2024-12-10 DIAGNOSIS — I10 ESSENTIAL (PRIMARY) HYPERTENSION: ICD-10-CM

## 2024-12-10 RX ORDER — CHLORTHALIDONE 25 MG/1
25 TABLET ORAL DAILY
Qty: 90 TABLET | Refills: 3 | Status: SHIPPED | OUTPATIENT
Start: 2024-12-10

## 2024-12-10 RX ORDER — PAROXETINE 30 MG/1
30 TABLET, FILM COATED ORAL DAILY
Qty: 90 TABLET | Refills: 1 | Status: SHIPPED | OUTPATIENT
Start: 2024-12-10

## 2024-12-10 NOTE — PROGRESS NOTES
Patient did not get chlorthalidone 25 mg daily and paroxetine 30 mg daily renewed at her recent appointment..  I have renewed both to her new [facility] [address]  Hydrochlorothiazide is preferred by the insurer but given that the patient is sensitive to medications we decided not to make that change.  Beers list recommendation against paroxetine does not yet apply to patient but might evaluate whether this medication could be replaced by an SSRI that is less prone to weight gain and sedation.   She plans to follow-up with Dr. Ford in 6 months and could reassess at that time.

## 2025-01-20 ENCOUNTER — CLINICAL SUPPORT (OUTPATIENT)
Dept: NUTRITION | Facility: HOSPITAL | Age: 55
End: 2025-01-20
Payer: COMMERCIAL

## 2025-01-20 NOTE — PROGRESS NOTES
[facility]  Diet Recommendation 1: Diabetes  Diet Recommendation 2: Healthy Eating  Food Intolerance Avoidance: NKFA  Nutrition Goals Stated: Lose wt, improve diabetes without use of medicine  Household Size: 2 Family Members  Interventions: Referral Number: 1st 6 Mo Referral 6 Mos  Interventions: Visit Number: 1 of 6 Visits - Max 6 Visits/Referral Each 6 Mo Period  Education Today: Carbohydrate Counting  Follow Up Notes for Future Visits: Was told she has diabetes in November and was ordered Metformin. She has not taken it, says she does not want to get hooked on it. Has not met with an RD or received any other information regarding dx. Recommended reaching out to MD via [employer] in order to discuss dx, goals, and plan of care. Provided basic overview of nutrition for diabetes- small frequent meals, balanced meals/snacks, importance of fiber, etc. Pt states she has already begun to increase her fiber intake by doing wheat cereal and leafy greens. Got tuna and jamil lettuce to make tuna boats. Also going to try zucchini and bran flakes from [employer].  Grains: 50-75% Whole  Fruit: 0-25% Fresh  Vegetables: 50-75% Fresh  Proteins: 0 Plant-based Items  Dairy: 25-50% Lowfat  Relevant [facility] Inpatient Discharge Items: Seen today at [facility]  Originating Site of Referral Order: [facility]  Initials of RD Assisting Today: [facility]

## 2025-06-12 DIAGNOSIS — F41.9 ANXIETY: ICD-10-CM

## 2025-06-12 DIAGNOSIS — F32.A DEPRESSION, UNSPECIFIED DEPRESSION TYPE: ICD-10-CM

## 2025-06-13 RX ORDER — PAROXETINE 30 MG/1
30 TABLET, FILM COATED ORAL DAILY
Qty: 90 TABLET | Refills: 1 | Status: SHIPPED | OUTPATIENT
Start: 2025-06-13

## 2025-07-07 ENCOUNTER — OFFICE VISIT (OUTPATIENT)
Dept: PRIMARY CARE | Facility: CLINIC | Age: 55
End: 2025-07-07
Payer: COMMERCIAL

## 2025-07-07 VITALS
DIASTOLIC BLOOD PRESSURE: 75 MMHG | HEIGHT: 63 IN | OXYGEN SATURATION: 99 % | SYSTOLIC BLOOD PRESSURE: 143 MMHG | WEIGHT: 228 LBS | TEMPERATURE: 95 F | HEART RATE: 73 BPM | RESPIRATION RATE: 16 BRPM | BODY MASS INDEX: 40.4 KG/M2

## 2025-07-07 DIAGNOSIS — H53.9 VISION CHANGES: ICD-10-CM

## 2025-07-07 DIAGNOSIS — F41.9 ANXIETY: ICD-10-CM

## 2025-07-07 DIAGNOSIS — E11.9 TYPE 2 DIABETES MELLITUS WITHOUT COMPLICATION, WITHOUT LONG-TERM CURRENT USE OF INSULIN: Primary | ICD-10-CM

## 2025-07-07 DIAGNOSIS — F32.A DEPRESSION, UNSPECIFIED DEPRESSION TYPE: ICD-10-CM

## 2025-07-07 DIAGNOSIS — I10 ESSENTIAL (PRIMARY) HYPERTENSION: ICD-10-CM

## 2025-07-07 PROCEDURE — 36415 COLL VENOUS BLD VENIPUNCTURE: CPT | Performed by: FAMILY MEDICINE

## 2025-07-07 PROCEDURE — 3078F DIAST BP <80 MM HG: CPT | Performed by: FAMILY MEDICINE

## 2025-07-07 PROCEDURE — 3077F SYST BP >= 140 MM HG: CPT | Performed by: FAMILY MEDICINE

## 2025-07-07 PROCEDURE — 3008F BODY MASS INDEX DOCD: CPT | Performed by: FAMILY MEDICINE

## 2025-07-07 PROCEDURE — 99214 OFFICE O/P EST MOD 30 MIN: CPT | Performed by: FAMILY MEDICINE

## 2025-07-07 ASSESSMENT — ENCOUNTER SYMPTOMS
OCCASIONAL FEELINGS OF UNSTEADINESS: 0
LOSS OF SENSATION IN FEET: 0
DEPRESSION: 0

## 2025-07-07 ASSESSMENT — PAIN SCALES - GENERAL: PAINLEVEL_OUTOF10: 0-NO PAIN

## 2025-07-07 NOTE — PROGRESS NOTES
Subjective   Patient ID: Ludmila Davidson is a 55 y.o. female who presents for Follow-up.    HPI   Ludmila Davidson is a 55 year old female with a past medical history of DMII, obesity, HTN, HLD, sleep disturbances, and anxiety presenting for a follow-up visit.     DM  - Diagnosed in the fall  - Rx for metformin- has not received it or started taking   - She was also referred to [facility]'s wellness department for guidance on lifestyle modification and dietary counseling, but did not attend, and is not interested in attending. She does not exercise, this is limited due to back pain  - Due to DM and HTN, has elevated ASCVD risk. She was prescribed atorvastatin but has not started this yet. She would like to start this medication and understands its importance  - Pt notes that she has been struggling to lose weight. She understands the negative health impact obesity, and believes her back pain is due to it. She is not interested in seeing a nutritionist at this time. Would like to explore weight loss medication options      Eye pain  -She currently endorses left eye pain, with a point of tenderness over her left eyebrow. She describes her pain as throbbing, comes and goes, and within the distribution of the left side of the face, left ear, left neck, and left shoulder. Notes that she has been dealing with these symptoms for two years now.   - She also endorses blurry vision bilaterally. Wears reading glasses, and was recently seen by an optometrist. She has not had a recent diabetic eye exam. Denies double vision, floaters, or complete blindness. Also denies hearing loss, polyuria, dysuria, leg swelling, or facial swelling.     HTN  - She notes compliance with chlorthalidone. She does not measure her blood pressure at home.     Other issues deferred to future visits:  - She continues to note sleep disturbances, and frequently wakes up at around 3:00 AM. Denies difficulty falling asleep, and believes she gets around 6-7 hours of  "sleep per night. She believes her anxiety is well controlled with paroxetine.   - She continues to have hot flashes and irritability due to menopause, but notes that these symptoms are tolerable.   - Notes numbness and tingling in her hands with shooting pain in her left forearm. Believes these symptoms are due to carpal tunnel, which she had while she was pregnant.     Health maintenance  - She is willing to schedule a colonoscopy, but is not willing to complete a mammogram, flu vaccine, or low-dose CT. She has not followed with a gynecologist s/p hysterectomy. Received a pneumonia vaccine in April 2025.     ROS:   Denies CP, SOB, N/V/D/C, fever, chills, recent fluctuations in weight, cough, rhinorrhea, post-nasal drip, abdominal pain, and changes in urination.     Objective   /75   Pulse 73   Temp 35 °C (95 °F)   Resp 16   Ht 1.588 m (5' 2.5\")   Wt 103 kg (228 lb)   SpO2 99%   BMI 41.04 kg/m²     Physical Exam  Constitutional:       General: She is not in acute distress.     Appearance: She is obese. She is not diaphoretic.   HENT:      Head: Normocephalic and atraumatic.      Right Ear: Hearing normal.      Left Ear: Hearing normal.      Ears:      Comments: No rash or erythema surrounding external ear.   Eyes:      General: Lids are normal. No visual field deficit or scleral icterus.        Right eye: No foreign body or discharge.         Left eye: No foreign body or discharge.      Extraocular Movements: Extraocular movements intact.      Conjunctiva/sclera: Conjunctivae normal.      Pupils: Pupils are equal, round, and reactive to light.      Visual Fields: Right eye visual fields normal and left eye visual fields normal.      Comments: Has glasses   Cardiovascular:      Rate and Rhythm: Normal rate and regular rhythm.   Pulmonary:      Effort: Pulmonary effort is normal. No respiratory distress.      Breath sounds: Normal breath sounds. No wheezing.   Abdominal:      General: Abdomen is flat. " Bowel sounds are normal. There is no distension.      Palpations: Abdomen is soft.      Tenderness: There is no abdominal tenderness. There is no guarding.   Musculoskeletal:      Cervical back: Normal range of motion and neck supple. No rigidity or tenderness.   Lymphadenopathy:      Cervical: No cervical adenopathy.   Neurological:      Mental Status: She is alert.         Assessment/Plan     Assessment  Ludmila Davidson is a 55 year old female with a past medical history of DMII, obesity, HTN, HLD, sleep disturbances, and anxiety presenting for a follow-up visit.     Plan:    7/7/25 Updates  -Ordered A1c, BMP, lipid panel, ESR, and albumin-creatinine ratio.   -Ordered metformin  -Ordered atorvastatin  -Colonoscopy scheduled  -Will update patient with medication regimen based on pending labs   -Will follow up in 1 month    DMII:  -11/26/24 A1c: 6.9%  -11/26/2024 renal function normal.   -Repeat A1c Ordered  -BMP ordered  -Albumin-creatinine ratio ordered  -Will discuss metformin vs glp1 agonist pending lab results     Obesity:  -BMI at visit: 41.04  -Will update patient with medication regimen based on pending labs     Blurry vision:  -11/26/24 A1c: 6.9%  -Repeat A1c Ordered  -Referral to ophthalmologist placed for evaluation.     Eye Pain:  Headache:  -Suspect most likely 2/2 vision changes vs tension headache. However given distribution cannot rule out temporal arteritis. Will obtain ESR    HTN:  -BP today: 148/82   -4/14/25 BP: 138/73  -11/26/24 K: 3.3  -Currently on chlorthalidone 25 mg QD  -Will adjust medication regimen based on today's BMP.     HLD:  -Ordered atorvastatin  -Lipid panel ordered    Preventative:  -Encouraged colonoscopy  - Declines mammogram, pap or LDCT at this time, will continue to address    Levar Smiley - MS-3    Patient was seen and examined by myself in conjunction with medical student. I have edited note above. Miko Moreno

## 2025-07-08 RX ORDER — ATORVASTATIN CALCIUM 40 MG/1
40 TABLET, FILM COATED ORAL DAILY
Qty: 90 TABLET | Refills: 1 | Status: SHIPPED | OUTPATIENT
Start: 2025-07-08 | End: 2026-08-12

## 2025-07-08 RX ORDER — PAROXETINE 30 MG/1
30 TABLET, FILM COATED ORAL DAILY
Qty: 90 TABLET | Refills: 1 | Status: SHIPPED | OUTPATIENT
Start: 2025-07-08

## 2025-07-08 RX ORDER — CHLORTHALIDONE 25 MG/1
25 TABLET ORAL DAILY
Qty: 90 TABLET | Refills: 1 | Status: SHIPPED | OUTPATIENT
Start: 2025-07-08

## 2025-07-10 LAB
ALBUMIN/CREAT UR: 4 MG/G CREAT
ANION GAP SERPL CALCULATED.4IONS-SCNC: 15 MMOL/L (CALC) (ref 7–17)
BUN SERPL-MCNC: 15 MG/DL (ref 7–25)
BUN/CREAT SERPL: ABNORMAL (CALC) (ref 6–22)
CALCIUM SERPL-MCNC: 10.3 MG/DL (ref 8.6–10.4)
CHLORIDE SERPL-SCNC: 97 MMOL/L (ref 98–110)
CHOLEST SERPL-MCNC: NORMAL MG/DL
CHOLEST/HDLC SERPL: NORMAL (CALC)
CO2 SERPL-SCNC: 25 MMOL/L (ref 20–32)
CREAT SERPL-MCNC: 0.82 MG/DL (ref 0.5–1.03)
CREAT UR-MCNC: 157 MG/DL (ref 20–275)
EGFRCR SERPLBLD CKD-EPI 2021: 85 ML/MIN/1.73M2
ERYTHROCYTE [SEDIMENTATION RATE] IN BLOOD BY WESTERGREN METHOD: 11 MM/H
EST. AVERAGE GLUCOSE BLD GHB EST-MCNC: 154 MG/DL
EST. AVERAGE GLUCOSE BLD GHB EST-SCNC: 8.5 MMOL/L
GLUCOSE SERPL-MCNC: 112 MG/DL (ref 65–99)
HBA1C MFR BLD: 7 %
HDLC SERPL-MCNC: NORMAL MG/DL
LDLC SERPL CALC-MCNC: NORMAL MG/DL (CALC)
MICROALBUMIN UR-MCNC: 0.7 MG/DL
NONHDLC SERPL-MCNC: NORMAL MG/DL (CALC)
POTASSIUM SERPL-SCNC: ABNORMAL MMOL/L
SODIUM SERPL-SCNC: 137 MMOL/L (ref 135–146)
TRIGL SERPL-MCNC: NORMAL MG/DL

## 2025-07-11 DIAGNOSIS — E11.9 TYPE 2 DIABETES MELLITUS WITHOUT COMPLICATION, WITHOUT LONG-TERM CURRENT USE OF INSULIN: Primary | ICD-10-CM

## 2025-08-27 ENCOUNTER — OFFICE VISIT (OUTPATIENT)
Dept: PRIMARY CARE | Facility: CLINIC | Age: 55
End: 2025-08-27
Payer: COMMERCIAL

## 2025-08-27 VITALS
DIASTOLIC BLOOD PRESSURE: 72 MMHG | TEMPERATURE: 97.7 F | HEIGHT: 63 IN | OXYGEN SATURATION: 97 % | BODY MASS INDEX: 38.8 KG/M2 | RESPIRATION RATE: 18 BRPM | WEIGHT: 219 LBS | SYSTOLIC BLOOD PRESSURE: 127 MMHG | HEART RATE: 62 BPM

## 2025-08-27 DIAGNOSIS — E11.9 TYPE 2 DIABETES MELLITUS WITHOUT COMPLICATION, WITHOUT LONG-TERM CURRENT USE OF INSULIN: Primary | ICD-10-CM

## 2025-08-27 DIAGNOSIS — I10 ESSENTIAL (PRIMARY) HYPERTENSION: ICD-10-CM

## 2025-08-27 PROCEDURE — 3008F BODY MASS INDEX DOCD: CPT | Performed by: FAMILY MEDICINE

## 2025-08-27 PROCEDURE — 3074F SYST BP LT 130 MM HG: CPT | Performed by: FAMILY MEDICINE

## 2025-08-27 PROCEDURE — 99212 OFFICE O/P EST SF 10 MIN: CPT | Performed by: FAMILY MEDICINE

## 2025-08-27 PROCEDURE — 3078F DIAST BP <80 MM HG: CPT | Performed by: FAMILY MEDICINE

## 2025-08-27 PROCEDURE — 99214 OFFICE O/P EST MOD 30 MIN: CPT | Performed by: FAMILY MEDICINE

## 2025-08-27 RX ORDER — CHLORTHALIDONE 25 MG/1
25 TABLET ORAL DAILY
Qty: 90 TABLET | Refills: 1 | Status: SHIPPED | OUTPATIENT
Start: 2025-08-27

## 2025-08-27 ASSESSMENT — PATIENT HEALTH QUESTIONNAIRE - PHQ9
2. FEELING DOWN, DEPRESSED OR HOPELESS: NOT AT ALL
SUM OF ALL RESPONSES TO PHQ9 QUESTIONS 1 AND 2: 0
1. LITTLE INTEREST OR PLEASURE IN DOING THINGS: NOT AT ALL

## 2025-08-27 ASSESSMENT — ENCOUNTER SYMPTOMS
LOSS OF SENSATION IN FEET: 0
OCCASIONAL FEELINGS OF UNSTEADINESS: 0
DEPRESSION: 0

## 2025-08-27 ASSESSMENT — PAIN SCALES - GENERAL: PAINLEVEL_OUTOF10: 0-NO PAIN

## 2025-09-15 ENCOUNTER — APPOINTMENT (OUTPATIENT)
Dept: OPHTHALMOLOGY | Facility: CLINIC | Age: 55
End: 2025-09-15
Payer: COMMERCIAL